# Patient Record
Sex: MALE | URBAN - METROPOLITAN AREA
[De-identification: names, ages, dates, MRNs, and addresses within clinical notes are randomized per-mention and may not be internally consistent; named-entity substitution may affect disease eponyms.]

---

## 2017-07-28 ENCOUNTER — EDUCATION (OUTPATIENT)
Dept: HEMATOLOGY/ONCOLOGY | Facility: CLINIC | Age: 42
End: 2017-07-28

## 2017-07-28 DIAGNOSIS — Z52.001 STEM CELL DONOR: Primary | ICD-10-CM

## 2017-07-28 NOTE — PROGRESS NOTES
"Spoke with Aureliano Albarado potential donor for Dannie Albarado today via telephone. Discussed the fact that her brotheris in need of a stem cell transplant. Mr. Albarado said that he was interested in possibly being the donor for his brother's transplant. Briefly discussed the donation process. Explained to  that if he would match his brother, that his commitment to donate is very important and that he has the right to change his mind. Also explained however, that a late decision not to donate can be life threatening to the patient. Explained that he should think seriously about his commitment before he has his tissue typing drawn, and if he feels uncomfortable moving forward, that it is okay for his to say "no." Also informed Mr. Albarado that if he is a perfect match for his brother, that we will contact him and ask if he is willing to donate. If he agrees to proceed, we will ask him about his health and schedule a physician appointment and more testing. He will receive a physical examination to be sure it is safe for him to donate and that his donation will provide the best possible outcome for the patient. We will follow medical guidelines that protect his health as a potential donor as well as the health of the transplant patient. Mr. Albarado was given the opportunity to ask questions. All questions were answered and the donor expressed understanding. Rebekah, MPH, [place] (ASCP)                  "

## 2017-08-23 ENCOUNTER — TELEPHONE (OUTPATIENT)
Dept: HEMATOLOGY/ONCOLOGY | Facility: CLINIC | Age: 42
End: 2017-08-23

## 2017-09-01 ENCOUNTER — LAB VISIT (OUTPATIENT)
Dept: LAB | Facility: HOSPITAL | Age: 42
End: 2017-09-01
Payer: COMMERCIAL

## 2017-09-01 DIAGNOSIS — Z52.001 STEM CELL DONOR: ICD-10-CM

## 2017-09-01 PROCEDURE — 81372 HLA I TYPING COMPLETE LR: CPT | Mod: PO

## 2017-09-01 PROCEDURE — 81375 HLA II TYPING AG EQUIV LR: CPT | Mod: PO

## 2017-09-19 LAB — HLATY INTERPRETATION: NORMAL

## 2017-09-20 LAB
DNA MARKER ASSESSED PNL: 2
DNA MARKER ASSESSED PNL: 2
DNA MARKER ASSESSED PNL: 27
DNA MARKER ASSESSED PNL: 32
DNA MARKER ASSESSED PNL: 4
DNA MARKER ASSESSED PNL: 44
DNA MARKER ASSESSED PNL: 5
DNA MARKER ASSESSED PNL: NORMAL
HLA DQA1 1: NORMAL
HLA DQA1 2: NORMAL
HLA DRB4 1: NORMAL
HLA LOW RES CLASS I TYPING INTERPRETATION: NORMAL
HLA LOW RES CLASS II TYPING INTERPRETATION: NORMAL
HLA-DP 1 SERO. EQUIV: NORMAL
HLA-DP 2 SERO. EQUIV: NORMAL
HLA-DPA1 1: NORMAL
HLA-DPA1 2: NORMAL
HLA-DPB1 1: NORMAL
HLA-DPB1 2: NORMAL
HLA-DQ 1 SERO. EQUIV: 7
HLA-DQ 2 SERO. EQUIV: 9
HLA-DQB1 1: NORMAL
HLA-DQB1 2: NORMAL
HLA-DRB1 1 SERO. EQUIV: 4
HLA-DRB1 1: NORMAL
HLA-DRB1 2 SERO. EQUIV: 9
HLA-DRB1 2: NORMAL
HLA-DRB3 1: NORMAL
HLA-DRB3 2: NORMAL
HLA-DRB345 1 SERO. EQUIV: 53
HLA-DRB345 2 SERO. EQUIV: NORMAL
HLA-DRB4 2: NORMAL
HLA-DRB5 1: NORMAL
HLA-DRB5 2: NORMAL
LDNA2 TESTING DATE: NORMAL

## 2017-10-30 ENCOUNTER — TELEPHONE (OUTPATIENT)
Dept: HEMATOLOGY/ONCOLOGY | Facility: CLINIC | Age: 42
End: 2017-10-30